# Patient Record
Sex: MALE | Race: WHITE | Employment: UNEMPLOYED | URBAN - METROPOLITAN AREA
[De-identification: names, ages, dates, MRNs, and addresses within clinical notes are randomized per-mention and may not be internally consistent; named-entity substitution may affect disease eponyms.]

---

## 2018-07-18 ENCOUNTER — TELEPHONE (OUTPATIENT)
Dept: NON INVASIVE DIAGNOSTICS | Age: 43
End: 2018-07-18

## 2018-07-21 NOTE — PROGRESS NOTES
Cardiac Electrophysiology Consultation Note    Alex Lema  1975  Date of Service: 7/23/2018  PCP: ERICA Can - CNP  Cardiologist: García Brannon MD  Electrophysiologist: Suzanne Maldonado DO    Reason for Consultation: Consideration for ICD implantation for Primary prevention. SUBJECTIVE: Alex Lema is a 43 yo male who I was asked to see in Cardiac Electrophysiology consultation for consideration for ICD implantation for primary prevention of SCD. He has the PMHx of: HTN and cerebral palsy who was diagnosed with a severe dilated, nonischemic cardiomyopathy in January 2018. He was admitted to [facility] in January with decompensated HF and underwent an echocardiogram with a noted EF of 10-15%. He subsequently underwent a LHC which revealed no significant CAD. He was started on appropriate GDMT and over the past 6-7 months the medications have been maximized. He underwent a repeat echocardiogram on 7/11/18 and the LVEF was interpreted as 35-40%. He reports NYHA II symptoms. He denies angina, syncope, paroxysmal nocturnal dyspnea and palpitations. He has been wearing a WCD since his diagnosis in January 2018 without an shocks.     Patient Active Problem List    Diagnosis Date Noted    Dilated cardiomyopathy [facility])        Past Medical History:   Diagnosis Date    [facility] [address]     CHF (congestive heart failure) ([facility] [address]     Hypertension        Past Surgical History:   Procedure Laterality Date    ACHILLES TENDON SURGERY      CARDIAC CATHETERIZATION  02/09/2018    DR Zarina Nieves       Family History   Problem Relation Age of Onset    Hypertension Mother     Mult Sclerosis Father        Social History   Substance Use Topics    Smoking status: Never Smoker    Smokeless tobacco: Never Used    Alcohol use No       Current Outpatient Prescriptions   Medication Sig Dispense Refill    furosemide (LASIX) 80 MG tablet Take 80 mg by mouth 2 times daily       lisinopril (PRINIVIL;ZESTRIL) 40 MG tablet Take 1 tablet by mouth daily 30 tablet 3    carvedilol (COREG) 25 MG tablet Take 1 tablet by mouth 2 times daily (with meals) 60 tablet 3    spironolactone (ALDACTONE) 25 MG tablet Take 1 tablet by mouth daily 30 tablet 3    hydrALAZINE (APRESOLINE) 50 MG tablet Take 2 tablets by mouth 2 times daily 90 tablet 3     No current facility-administered medications for this visit. No Known Allergies    ROS:   Constitutional: Negative for fever, activity change and appetite change. HENT: Negative for epistaxis, difficulty swallowing. Eyes: Negative for blurred vision or double vision. Respiratory: Negative for cough, chest tightness, and wheezing. Cardiovascular: Negative for chest pain, palpitations and leg swelling. +SIM. Gastrointestinal: Negative for abdominal pain, heartburn, or blood in stool. Genitourinary: Negative for hematuria, burning or frequency. Musculoskeletal: Negative for myalgias, stiffness, or swelling. Skin: Negative for rash, pain, or lumps. Neurological: Negative for syncope, seizures, or headaches. Psychiatric/Behavioral: Negative for confusion and agitation. The patient is not nervous/anxious. PHYSICAL EXAM:  Vitals:    07/23/18 0730   BP: 126/86   Pulse: 58   Resp: 18   Weight: 251 lb 12.8 oz (114.2 kg)   Height: 6' 1\" (1.854 m)     Constitutional: Oriented to person, place, and time. Well-developed and cooperative. Head: Normocephalic and atraumatic. Eyes: Conjunctivae are normal. Pupils are equal, round, and reactive to light. Neck: No hepatojugular reflux and no JVD present. Carotid bruit is not present. Cardiovascular: S1 normal, S2 normal and intact distal pulses. A regular rhythm present. PMI is not displaced. Pulmonary/Chest: Effort normal and breath sounds normal. No respiratory distress. Abdominal: Soft. Normal appearance and bowel sounds are normal.  No abdominal bruit and no pulsatile midline mass. There is no hepatomegaly.  No tenderness. Musculoskeletal: Normal range of motion of all extremities, no muscle weakness. Neurological: Alert and oriented to person, place, and time. Gait normal.   Skin: Skin is warm and dry. No bruising, no ecchymosis and no rash noted. Extremity: No clubbing or cyanosis. No edema. Psychiatric: Normal mood and affect. Thought content normal.     Pertinent Labs:  CBC: No results for input(s): WBC, HGB, HCT, PLT in the last 72 hours. BMP:No results for input(s): NA, K, CL, CO2, BUN, CREATININE, CALCIUM in the last 72 hours. Invalid input(s): GLU  ABGs: No results found for: PHART, PO2ART, MVC6EIX  INR: No results for input(s): INR in the last 72 hours. BNP: No results for input(s): BNP in the last 72 hours. TSH: No results found for: TSH   Cardiac Injury Profile: No results for input(s): CKTOTAL, CKMB, CKMBINDEX, TROPONINI in the last 72 hours. Lipid Profile: No results found for: TRIG, HDL, LDLCALC, CHOL   Hemoglobin A1C: No components found for: HGBA1C   Xray:   No orders to display     Pertinent Cardiac Testing:     Echocardiogram 2018:  LVEF: 10-15%, severe MR    Echocardiogram 2018:  LVEF 35-40%, mild MR/TR    Last [facility](18):   [address]                   [address] [place] [address]                               CARDIAC CATHETERIZATION     PATIENT NAME: Jessica Billings                        :        1975  MED REC NO:   [id number]                            ROOM:       [id number]  ACCOUNT NO:   [de-identified]                          ADMIT DATE: 2018  PROVIDER:     Hilary Wright MD     DATE OF PROCEDURE:  2018     PROCEDURES:  Left heart catheterization and selective coronary angiography  done through right radial approach.     SEDATION:  The patient received intravenous Versed and intravenous fentanyl  for sedation.     INDICATION:  Newly diagnosed cardiomyopathy with severe LV systolic  dysfunction and congestive heart failure.     PRESSURES:  1. Aorta 121/79 with a mean of 99.  2.  Left ventricular end-diastolic pressure 14.  3.  There was no gradient across aortic valve.     CORONARY ANGIOGRAPHY:  Left main. The left main artery did not appear to  have any significant angiographic disease.     LAD:  The left anterior descending artery had mild luminal irregularities  in its middle segment with less than 20% angiographic luminal narrowing. The large diagonal branches did not appear to have any significant  angiographic disease.     LCX:  The left circumflex did not appear to have any significant  angiographic disease.     RCA:  The right coronary artery was a large dominant vessel. It had  luminal irregularities in its proximal and middle segment with around 20%  angiographic luminal narrowing at the level of the crux. The distal  vessel, the posterior descending artery branch and the posterolateral  branch did not appear to have any significant disease.     The right radial arterial sheath was removed at the end of the procedure  and a TR band was applied with adequate hemostasis and with preservation of  pulse.     The patient tolerated the procedure well and left the cardiac  catheterization laboratory stable condition.     CONCLUSION:  Coronary atherosclerosis, but without any significant  angiographic luminal narrowings.           Judy Phelan MD    ECG 7/23/2018: Sinus bradycardia at 58 bpm, VT: 220 ms, LAD, LBBB with a QRSd: 185 ms, QTc: 480 ms. I have independently reviewed all of the ECGs and rhythm strips per above. I have personally reviewed the laboratory, cardiac diagnostic and radiographic testing as outlined above. I have reviewed previous records noted in [address]. Impression:    1.  Nonischemic cardiomyopathy/HFrEF  - original diagnosis: January 2018  - NYHA II symptoms  - LVEF 10-15%--January 2018  - [facility] as above  - Last TTE 7/11/18: LVEF: 35-40%  GDMT:  BB: Coreg 25 mg Electrophysiology  [facility]

## 2018-07-23 ENCOUNTER — OFFICE VISIT (OUTPATIENT)
Dept: NON INVASIVE DIAGNOSTICS | Age: 43
End: 2018-07-23
Payer: COMMERCIAL

## 2018-07-23 VITALS
RESPIRATION RATE: 18 BRPM | DIASTOLIC BLOOD PRESSURE: 86 MMHG | WEIGHT: 251.8 LBS | BODY MASS INDEX: 33.37 KG/M2 | HEIGHT: 73 IN | HEART RATE: 58 BPM | SYSTOLIC BLOOD PRESSURE: 126 MMHG

## 2018-07-23 DIAGNOSIS — I42.0 DILATED CARDIOMYOPATHY (HCC): Primary | ICD-10-CM

## 2018-07-23 PROCEDURE — G8427 DOCREV CUR MEDS BY ELIG CLIN: HCPCS | Performed by: INTERNAL MEDICINE

## 2018-07-23 PROCEDURE — 99245 OFF/OP CONSLTJ NEW/EST HI 55: CPT | Performed by: INTERNAL MEDICINE

## 2018-07-23 PROCEDURE — G8417 CALC BMI ABV UP PARAM F/U: HCPCS | Performed by: INTERNAL MEDICINE

## 2018-07-23 PROCEDURE — 93000 ELECTROCARDIOGRAM COMPLETE: CPT | Performed by: INTERNAL MEDICINE

## 2018-07-23 RX ORDER — FUROSEMIDE 80 MG
80 TABLET ORAL 2 TIMES DAILY
COMMUNITY
Start: 2018-07-22

## 2018-07-26 ENCOUNTER — HOSPITAL ENCOUNTER (OUTPATIENT)
Dept: CARDIOLOGY | Age: 43
Discharge: HOME | End: 2018-07-26
Payer: COMMERCIAL

## 2018-07-26 DIAGNOSIS — I42.0 DILATED CARDIOMYOPATHY (HCC): ICD-10-CM

## 2018-07-26 PROCEDURE — 93308 TTE F-UP OR LMTD: CPT

## 2018-07-30 ENCOUNTER — TELEPHONE (OUTPATIENT)
Dept: NON INVASIVE DIAGNOSTICS | Age: 43
End: 2018-07-30

## 2018-07-30 NOTE — TELEPHONE ENCOUNTER
----- Message from Jamil Harris DO sent at 7/27/2018  7:30 AM EDT -----  Noni,    Please let him know that I reviewed his echo and the good news is that his heart function even looks better. It is now 45%. At this point he no longer needs his LifeVest and presently he does not qualify for an ICD given that his heart function is above 35%. I will forward a note to his Cardiologist as well. Thanks. BTW--no f/u is necessary with me.  ----- Message -----  From: Althea Frazier Incoming Cardiology Results From [facility]  Sent: 7/26/2018   1:03 PM  To:  Jamil Harris DO